# Patient Record
Sex: FEMALE | Race: BLACK OR AFRICAN AMERICAN | Employment: FULL TIME | URBAN - METROPOLITAN AREA
[De-identification: names, ages, dates, MRNs, and addresses within clinical notes are randomized per-mention and may not be internally consistent; named-entity substitution may affect disease eponyms.]

---

## 2023-02-28 ENCOUNTER — HOSPITAL ENCOUNTER (OUTPATIENT)
Facility: HOSPITAL | Age: 58
Discharge: HOME | End: 2023-03-03
Payer: COMMERCIAL

## 2023-02-28 VITALS — HEIGHT: 64 IN | WEIGHT: 157.5 LBS | BODY MASS INDEX: 26.89 KG/M2

## 2023-02-28 LAB
ANION GAP SERPL CALC-SCNC: 2 MMOL/L (ref 3–18)
BUN SERPL-MCNC: 11 MG/DL (ref 7–18)
BUN/CREAT SERPL: 15 (ref 12–20)
CALCIUM SERPL-MCNC: 9.3 MG/DL (ref 8.5–10.1)
CHLORIDE SERPL-SCNC: 109 MMOL/L (ref 100–111)
CO2 SERPL-SCNC: 33 MMOL/L (ref 21–32)
CREAT SERPL-MCNC: 0.75 MG/DL (ref 0.6–1.3)
EKG ATRIAL RATE: 65 BPM
EKG DIAGNOSIS: NORMAL
EKG P AXIS: -16 DEGREES
EKG P-R INTERVAL: 146 MS
EKG Q-T INTERVAL: 380 MS
EKG QRS DURATION: 80 MS
EKG QTC CALCULATION (BAZETT): 395 MS
EKG R AXIS: 69 DEGREES
EKG T AXIS: 66 DEGREES
EKG VENTRICULAR RATE: 65 BPM
ERYTHROCYTE [DISTWIDTH] IN BLOOD BY AUTOMATED COUNT: 15 % (ref 11.6–14.5)
GLUCOSE SERPL-MCNC: 86 MG/DL (ref 74–99)
HCT VFR BLD AUTO: 37.3 % (ref 35–45)
HGB BLD-MCNC: 11.8 G/DL (ref 12–16)
MCH RBC QN AUTO: 28.9 PG (ref 24–34)
MCHC RBC AUTO-ENTMCNC: 31.6 G/DL (ref 31–37)
MCV RBC AUTO: 91.2 FL (ref 78–100)
NRBC # BLD: 0 K/UL (ref 0–0.01)
NRBC BLD-RTO: 0 PER 100 WBC
PLATELET # BLD AUTO: 231 K/UL (ref 135–420)
PMV BLD AUTO: 10.5 FL (ref 9.2–11.8)
POTASSIUM SERPL-SCNC: 4.8 MMOL/L (ref 3.5–5.5)
RBC # BLD AUTO: 4.09 M/UL (ref 4.2–5.3)
SODIUM SERPL-SCNC: 144 MMOL/L (ref 136–145)
WBC # BLD AUTO: 4.8 K/UL (ref 4.6–13.2)

## 2023-02-28 PROCEDURE — 80048 BASIC METABOLIC PNL TOTAL CA: CPT

## 2023-02-28 PROCEDURE — 36415 COLL VENOUS BLD VENIPUNCTURE: CPT

## 2023-02-28 PROCEDURE — 93005 ELECTROCARDIOGRAM TRACING: CPT | Performed by: UROLOGY

## 2023-02-28 PROCEDURE — 85027 COMPLETE CBC AUTOMATED: CPT

## 2023-03-01 NOTE — PERIOP NOTE
Spoke with Melanie Jimenez at [place]'s office. Sena Garduno is working on the clearance. [facility] 190 rec'd call from Sena Garduno. Cardiologist is in the [address] [facility] [address] and his staff working on contacting in between cases to clear.

## 2023-03-01 NOTE — H&P
Urology [address] [place] [address] [address] [address]  Tel: [phone]  Fax: [phone]    Patient : Tere Ramirez   YOB: 1965   Birth Sex: Female      Current Gender: Female      Date:  02/06/2023 2:30 PM    Visit Type:   Office Visit   Assessment/Plan  # Detail Type Description    1. Assessment Microscopic hematuria (R31.29). Patient Plan Patient underwent attempted cystoscopy today she could not tolerated procedure. Will schedule her for cystoscopy possible bladder biopsy under sedation. Risks including pain infection bleeding reviewed she understands will proceed    Plan Orders Cytology Pro XT with reflex to FISH to be performed. Obtained on 02/06/2023. Additional Visit Information      This 59year old female presents for Hematuria. History of Present Illness  1. Hematuria   Pertinent history includes being at least 34years of age but not history of bladder cancer, history of irritative voiding symptoms, prior history of stones, history of renal cancer, family history of bladder cancer, family history of renal cancer, family history of stones, exposure to radiation, self-catheterization, smoking and trauma or urolithiasis. The patient also complains of urinary incontinence. The patient denies chills, dysuria, fever, incomplete emptying, nausea, pressure, secondhand smoke, urethral bleeding, urinary dribbling, urinary frequency and vomiting. Additional information: Year old female patient new to Urology Clinic today with history gross hematuria with wiping after history of stones and is not on any anticoagulation. Did have 3-5 red blood cells on September 8, 2022 on urinalysis. Comments: 02/06/2023   Patient with history of  gross hematuria followed by microscopic hematuria. CT scan obtained no abnormalities were identified. She is here today to undergo cystoscopy.       Past Medical/Surgical History   (Reviewed, updated)  Disease/disorder Onset Date Management Date Comments     finger surgery 06/2022     hemorrhoids           Problem List  Problem List reviewed. Medications (active prior to today)  Medication Instructions Start Date Stop Date Refilled Elsewhere   simvastatin take 1 tablet by oral route  every day in the evening //   Y   omeprazole take 2 capsule by oral route  every day before a meal //   Y   Zyrtec take 1 tablet by oral route  every day //   Y       Medication Reconciliation  Medications reconciled today. Allergies  Ingredient Reaction (Severity) Medication Name Comment   LATEX        Reviewed, no changes. Family History   (Reviewed, updated)      Social History  (Reviewed, updated)  Tobacco use reviewed. Preferred language is [place]. Marital Status/Family/Social Support  Marital status:      Tobacco use status: Current non-smoker. Smoking status: Never smoker. Tobacco Screening  Patient has never used tobacco. Patient has not used tobacco in the last 30 days. Patient has not used smokeless tobacco in the last 30 days. Smoking Status  Type Smoking Status Usage Per Day Years Used Pack Years Total Pack Years    Never smoker         Alcohol  There is no history of alcohol use. Caffeine  The patient uses caffeine: coffee and soda. - 8 oz a day. Review of Systems  System Neg/Pos Details   Constitutional Negative Chills and Fever. ENMT Negative Ear infections and Sore throat. Eyes Negative Blurred vision, Double vision and Eye pain. Respiratory Negative Asthma, Chronic cough, Dyspnea and Wheezing. Cardio Negative Chest pain. GI Negative Constipation, Decreased appetite, Diarrhea, Nausea and Vomiting. Endocrine Negative Cold intolerance, Heat intolerance, Increased thirst and Weight loss. Neuro Negative Headache and Tremors. Psych Negative Anxiety and Depression. Integumentary Negative Itching skin and Rash. MS Negative Back pain and Joint pain. Hema/Lymph Negative Easy bleeding. Vital Signs   Height  Time ft in cm Last Measured Height Position   3:52 PM 5.0 4.00 162.56 11/29/2022 Standing     Weight/BSA/BMI  Time lb oz kg Context BMI kg/m2 BSA m2   3:52 .80  72.030 dressed with shoes 27.26      Measured by  Time Measured by   3:52 PM Sury Lewis Day     Medications (added, continued, or stopped today)  Start Date Medication Directions PRN Status PRN Reason Instruction Stop Date    omeprazole take 2 capsule by oral route  every day before a meal N       simvastatin take 1 tablet by oral route  every day in the evening N       Zyrtec take 1 tablet by oral route  every day N          Active Patient Care Team Members  Name Contact Agency Type Support Role Relationship Active Date Inactive Date Specialty   [facility]   Emergency Contact Other      Leona Cheema   Patient provider PCP          Provider:    Yareli Montgomery MD 02/07/2023 7:22 PM     Document generated by:  Anisha Rodriguez 02/07/2023 07:22 PM      ----------------------------------------------------------------------------------------------------------------------------------------------------------------------      Electronically signed by Yareli Montgomery MD on 02/07/2023 07:52 PM

## 2023-03-02 ENCOUNTER — ANESTHESIA EVENT (OUTPATIENT)
Facility: HOSPITAL | Age: 58
End: 2023-03-02
Payer: COMMERCIAL

## 2023-03-02 ENCOUNTER — ANESTHESIA (OUTPATIENT)
Facility: HOSPITAL | Age: 58
End: 2023-03-02
Payer: COMMERCIAL

## 2023-03-02 ENCOUNTER — HOSPITAL ENCOUNTER (OUTPATIENT)
Facility: HOSPITAL | Age: 58
Setting detail: OUTPATIENT SURGERY
Discharge: HOME | End: 2023-03-02
Attending: UROLOGY | Admitting: UROLOGY
Payer: COMMERCIAL

## 2023-03-02 VITALS
DIASTOLIC BLOOD PRESSURE: 70 MMHG | SYSTOLIC BLOOD PRESSURE: 125 MMHG | HEIGHT: 63 IN | HEART RATE: 69 BPM | RESPIRATION RATE: 16 BRPM | BODY MASS INDEX: 27.89 KG/M2 | TEMPERATURE: 98 F | WEIGHT: 157.4 LBS | OXYGEN SATURATION: 95 %

## 2023-03-02 PROCEDURE — 3700000001 HC ADD 15 MINUTES (ANESTHESIA): Performed by: UROLOGY

## 2023-03-02 PROCEDURE — 3700000000 HC ANESTHESIA ATTENDED CARE: Performed by: UROLOGY

## 2023-03-02 PROCEDURE — 3600000012 HC SURGERY LEVEL 2 ADDTL 15MIN: Performed by: UROLOGY

## 2023-03-02 PROCEDURE — 2709999900 HC NON-CHARGEABLE SUPPLY: Performed by: UROLOGY

## 2023-03-02 PROCEDURE — 6360000002 HC RX W HCPCS: Performed by: NURSE ANESTHETIST, CERTIFIED REGISTERED

## 2023-03-02 PROCEDURE — 3600000002 HC SURGERY LEVEL 2 BASE: Performed by: UROLOGY

## 2023-03-02 PROCEDURE — 7100000011 HC PHASE II RECOVERY - ADDTL 15 MIN: Performed by: UROLOGY

## 2023-03-02 PROCEDURE — 6360000002 HC RX W HCPCS: Performed by: UROLOGY

## 2023-03-02 PROCEDURE — 2500000003 HC RX 250 WO HCPCS: Performed by: NURSE ANESTHETIST, CERTIFIED REGISTERED

## 2023-03-02 PROCEDURE — 2580000003 HC RX 258: Performed by: UROLOGY

## 2023-03-02 PROCEDURE — 7100000010 HC PHASE II RECOVERY - FIRST 15 MIN: Performed by: UROLOGY

## 2023-03-02 RX ORDER — PROPOFOL 10 MG/ML
INJECTION, EMULSION INTRAVENOUS PRN
Status: DISCONTINUED | OUTPATIENT
Start: 2023-03-02 | End: 2023-03-02 | Stop reason: SDUPTHER

## 2023-03-02 RX ORDER — FENTANYL CITRATE 50 UG/ML
INJECTION, SOLUTION INTRAMUSCULAR; INTRAVENOUS PRN
Status: DISCONTINUED | OUTPATIENT
Start: 2023-03-02 | End: 2023-03-02 | Stop reason: SDUPTHER

## 2023-03-02 RX ORDER — MIDAZOLAM HYDROCHLORIDE 1 MG/ML
INJECTION INTRAMUSCULAR; INTRAVENOUS PRN
Status: DISCONTINUED | OUTPATIENT
Start: 2023-03-02 | End: 2023-03-02 | Stop reason: SDUPTHER

## 2023-03-02 RX ORDER — DROPERIDOL 2.5 MG/ML
0.62 INJECTION, SOLUTION INTRAMUSCULAR; INTRAVENOUS
Status: CANCELLED | OUTPATIENT
Start: 2023-03-02 | End: 2023-03-03

## 2023-03-02 RX ORDER — SODIUM CHLORIDE 0.9 % (FLUSH) 0.9 %
5-40 SYRINGE (ML) INJECTION PRN
Status: CANCELLED | OUTPATIENT
Start: 2023-03-02

## 2023-03-02 RX ORDER — OXYCODONE HYDROCHLORIDE 5 MG/1
5 TABLET ORAL
Status: CANCELLED | OUTPATIENT
Start: 2023-03-02 | End: 2023-03-03

## 2023-03-02 RX ORDER — FENTANYL CITRATE 50 UG/ML
25 INJECTION, SOLUTION INTRAMUSCULAR; INTRAVENOUS EVERY 5 MIN PRN
Status: CANCELLED | OUTPATIENT
Start: 2023-03-02

## 2023-03-02 RX ORDER — LIDOCAINE HYDROCHLORIDE 20 MG/ML
INJECTION, SOLUTION EPIDURAL; INFILTRATION; INTRACAUDAL; PERINEURAL PRN
Status: DISCONTINUED | OUTPATIENT
Start: 2023-03-02 | End: 2023-03-02 | Stop reason: SDUPTHER

## 2023-03-02 RX ORDER — SODIUM CHLORIDE, SODIUM LACTATE, POTASSIUM CHLORIDE, CALCIUM CHLORIDE 600; 310; 30; 20 MG/100ML; MG/100ML; MG/100ML; MG/100ML
INJECTION, SOLUTION INTRAVENOUS CONTINUOUS
Status: CANCELLED | OUTPATIENT
Start: 2023-03-02

## 2023-03-02 RX ORDER — LABETALOL HYDROCHLORIDE 5 MG/ML
10 INJECTION, SOLUTION INTRAVENOUS
Status: CANCELLED | OUTPATIENT
Start: 2023-03-02

## 2023-03-02 RX ORDER — ONDANSETRON 2 MG/ML
4 INJECTION INTRAMUSCULAR; INTRAVENOUS
Status: CANCELLED | OUTPATIENT
Start: 2023-03-02 | End: 2023-03-03

## 2023-03-02 RX ORDER — IPRATROPIUM BROMIDE AND ALBUTEROL SULFATE 2.5; .5 MG/3ML; MG/3ML
1 SOLUTION RESPIRATORY (INHALATION)
Status: CANCELLED | OUTPATIENT
Start: 2023-03-02 | End: 2023-03-03

## 2023-03-02 RX ORDER — SODIUM CHLORIDE, SODIUM LACTATE, POTASSIUM CHLORIDE, CALCIUM CHLORIDE 600; 310; 30; 20 MG/100ML; MG/100ML; MG/100ML; MG/100ML
INJECTION, SOLUTION INTRAVENOUS CONTINUOUS
Status: DISCONTINUED | OUTPATIENT
Start: 2023-03-02 | End: 2023-03-02 | Stop reason: HOSPADM

## 2023-03-02 RX ORDER — SODIUM CHLORIDE 9 MG/ML
INJECTION, SOLUTION INTRAVENOUS PRN
Status: CANCELLED | OUTPATIENT
Start: 2023-03-02

## 2023-03-02 RX ORDER — HYDROMORPHONE HYDROCHLORIDE 1 MG/ML
0.25 INJECTION, SOLUTION INTRAMUSCULAR; INTRAVENOUS; SUBCUTANEOUS EVERY 5 MIN PRN
Status: CANCELLED | OUTPATIENT
Start: 2023-03-02

## 2023-03-02 RX ORDER — DIPHENHYDRAMINE HYDROCHLORIDE 50 MG/ML
12.5 INJECTION INTRAMUSCULAR; INTRAVENOUS
Status: CANCELLED | OUTPATIENT
Start: 2023-03-02 | End: 2023-03-03

## 2023-03-02 RX ORDER — SODIUM CHLORIDE 0.9 % (FLUSH) 0.9 %
5-40 SYRINGE (ML) INJECTION EVERY 12 HOURS SCHEDULED
Status: CANCELLED | OUTPATIENT
Start: 2023-03-02

## 2023-03-02 RX ADMIN — PROPOFOL 40 MG: 10 INJECTION, EMULSION INTRAVENOUS at 15:43

## 2023-03-02 RX ADMIN — PROPOFOL 50 MG: 10 INJECTION, EMULSION INTRAVENOUS at 15:40

## 2023-03-02 RX ADMIN — MIDAZOLAM 2 MG: 1 INJECTION INTRAMUSCULAR; INTRAVENOUS at 15:32

## 2023-03-02 RX ADMIN — Medication 2000 MG: at 15:34

## 2023-03-02 RX ADMIN — PROPOFOL 50 MG: 10 INJECTION, EMULSION INTRAVENOUS at 15:39

## 2023-03-02 RX ADMIN — PROPOFOL 30 MG: 10 INJECTION, EMULSION INTRAVENOUS at 15:41

## 2023-03-02 RX ADMIN — LIDOCAINE HYDROCHLORIDE 60 MG: 20 INJECTION, SOLUTION EPIDURAL; INFILTRATION; INTRACAUDAL; PERINEURAL at 15:39

## 2023-03-02 RX ADMIN — SODIUM CHLORIDE, POTASSIUM CHLORIDE, SODIUM LACTATE AND CALCIUM CHLORIDE: 600; 310; 30; 20 INJECTION, SOLUTION INTRAVENOUS at 15:09

## 2023-03-02 RX ADMIN — FENTANYL CITRATE 25 MCG: 50 INJECTION, SOLUTION INTRAMUSCULAR; INTRAVENOUS at 15:32

## 2023-03-02 RX ADMIN — PROPOFOL 30 MG: 10 INJECTION, EMULSION INTRAVENOUS at 15:42

## 2023-03-02 ASSESSMENT — PAIN - FUNCTIONAL ASSESSMENT: PAIN_FUNCTIONAL_ASSESSMENT: NONE - DENIES PAIN

## 2023-03-02 NOTE — INTERVAL H&P NOTE
Update History & Physical    The patient's History and Physical of February 6, 2023 was reviewed with the patient and I examined the patient. There was no change. The surgical site was confirmed by the patient and me. Plan: The risks, benefits, expected outcome, and alternative to the recommended procedure have been discussed with the patient. Patient understands and wants to proceed with the procedure.      Electronically signed by Ophelia Montgomery MD on 3/2/2023 at 3:32 PM

## 2023-03-02 NOTE — PERIOP NOTE
Discharge instructions given to patient and caregiver. Written instructions given to patient to take home. Verbal understanding given by patient and caregiver. ID band removed before leaving.

## 2023-03-02 NOTE — PERIOP NOTE
Reviewed PTA medication list with patient/caregiver and patient/caregiver denies any additional medications. Patient admits to having a responsible adult care for them at home for at least 24 hours after surgery. Patient encouraged to use gown warming system and informed that using said warming gown to regulate body temperature prior to a procedure has been shown to help reduce the risks of blood clots and infection. Patient's pharmacy of choice verified and documented in PTA medication section. Dual skin assessment & fall risk band verification completed with Pawel Burr RN.

## 2023-03-02 NOTE — PERIOP NOTE
TRANSFER - IN REPORT:    Verbal report received from [place], RN on [address]  being received from [facility] [address] for routine post-op      Report consisted of patient's Situation, Background, Assessment and   Recommendations(SBAR). Information from the following report(s) Nurse Handoff Report, Surgery Report, and MAR was reviewed with the receiving nurse. Opportunity for questions and clarification was provided. Assessment completed upon patient's arrival to unit and care assumed.

## 2023-03-02 NOTE — ANESTHESIA POSTPROCEDURE EVALUATION
Post-Anesthesia Evaluation and Assessment    Cardiovascular Function/Vital Signs/Pain Score:  Vitals  BP: 130/67  Temp: 97.6 °F (36.4 °C)  Temp Source: Temporal  Heart Rate: 66  Resp: 16  SpO2: 100 %  Height: 5' 3\" (160 cm)  Weight: 157 lb 6.4 oz (71.4 kg)    Patient is status post Procedure(s):  CYSTOSCOPY, WITH POSSIBLE BLADDER BIOPSY. Nausea/Vomiting: Controlled. Postoperative hydration reviewed and adequate. Pain:  Managed    Mental Status and Level of Consciousness: Baseline and appropriate for discharge. Adequate oxygenation and airway patent. Complications related to anesthesia: None    Post-anesthesia assessment completed. No concerns. Patient has met all discharge requirements.     Signed By: Abi Salas MD    March 2, 2023

## 2023-03-02 NOTE — OP NOTE
Operative Note      Patient: Suraj Thurman  YOB: 1965  MRN: [id number]    Date of Procedure: 3/2/2023    Pre-Op Diagnosis: HEMATURIA    Post-Op Diagnosis: Same       Procedure(s):  CYSTOSCOPY, WITH POSSIBLE BLADDER BIOPSY    Surgeon(s):  Fernando Pearson MD    Assistant:   * No surgical staff found *    Anesthesia: Monitor Anesthesia Care    Estimated Blood Loss (mL): Minimal    Complications: None    Specimens:   * No specimens in log *    Implants:  * No implants in log *      Drains: * No LDAs found *    Findings: normal bladder    Detailed Description of Procedure:   Patient brought the operating placed in supine position. After ministration of IV sedation she was placed into the lithotomy position groin genitalia were prepped and draped in usual sterile fashion I began with a 21 Stateless cystoscope cystourethroscopy was performed using a 30 and 70 degree lens.   No abnormalities were seen throughout the bladder both ureteral orifice ease were normal.  At this point the bladder was emptied cystoscope was removed she was placed back into the supine position she was awoken and transferred to PACU in stable condition        Electronically signed by Yogesh Casas MD on 3/2/2023 at 3:51 PM

## 2023-03-02 NOTE — DISCHARGE INSTRUCTIONS
Kristina Meza. Tea Valente M.D. [facility]  [address], [address] [place], [facility]  Office: [phone]  Fax:    [phone]: Procedure(s):  CYSTOSCOPY, [facility] IMMEDIATELY if any of the following occur: You are unable to urinate. Urgency to urinate is not uncommon. You find yourself urinating small frequent amounts associated with severe lower abdominal discomfort. Bright red blood with clots in the urine. Some reddish urine is not uncommon and should be treated with increasing the amount of fluids you drink. Temperature above 101.5° and / or chills. You are nauseous and / or vomiting and you cannot hold down any fluids. Your pain is not controlled with the pain medication prescribed. Special Considerations:     Do not drive for at least 24 hours after the procedure and until you are no longer taking narcotic pain medication and you are able to move and react without hesitation. MEDICATIONS:  Pain   []  Norco®   []  Percocet®  [] Dilaudid®    []  Tramadol  [] Ketorolac   Antibiotics   []  Cipro   []  Keflex    [] Levaquin   []  Bactrim DS®      [] Cefuroxime   Urination   []  Vesicare®   []  Flomax      Burning   []  Pyridium®   []  UribelTM      Nausea   []  Zofran®   []  Phenergan®      Miscellaneous   []            [] Prescriptions Written on Chart    [x] NO Prescriptions needed       Our office will call you tomorrow to schedule your first follow-up appointment. Please contact [place] [address] [facility] at [phone] or go to the nearest Emergency Department / Urgent Care facility for any other medical questions or concerns.        DISCHARGE SUMMARY from Nurse    PATIENT INSTRUCTIONS:    After general anesthesia or intravenous sedation, for 24 hours or while taking prescription Narcotics:  Limit your activities  Do not drive and operate hazardous machinery  Do not make important personal or business decisions  Do  not drink alcoholic beverages  If you have not urinated within 8 hours after discharge, please contact your surgeon on call. Report the following to your surgeon:  Excessive pain, swelling, redness or odor of or around the surgical area  Temperature over 100.5  Nausea and vomiting lasting longer than 4 hours or if unable to take medications  Any signs of decreased circulation or nerve impairment to extremity: change in color, persistent  numbness, tingling, coldness or increase pain  Any questions    What to do at Home:  Recommended activity: activity as tolerated. If you experience any of the following symptoms as stated above, please follow up with Dr. Jadiel Perea. *  Please give a list of your current medications to your Primary Care Provider. *  Please update this list whenever your medications are discontinued, doses are      changed, or new medications (including over-the-counter products) are added. *  Please carry medication information at all times in case of emergency situations. These are general instructions for a healthy lifestyle:    No smoking/ No tobacco products/ Avoid exposure to second hand smoke  Surgeon General's Warning:  Quitting smoking now greatly reduces serious risk to your health. Obesity, smoking, and sedentary lifestyle greatly increases your risk for illness    A healthy diet, regular physical exercise & weight monitoring are important for maintaining a healthy lifestyle    You may be retaining fluid if you have a history of heart failure or if you experience any of the following symptoms:  Weight gain of 3 pounds or more overnight or 5 pounds in a week, increased swelling in our hands or feet or shortness of breath while lying flat in bed. Please call your doctor as soon as you notice any of these symptoms; do not wait until your next office visit. The discharge information has been reviewed with the patient and caregiver.   The patient and caregiver verbalized understanding. Discharge medications reviewed with the patient and caregiver and appropriate educational materials and side effects teaching were provided. Patient armband removed and shredded.    ___________________________________________________________________________________________________________________________________

## (undated) DEVICE — CYSTO PACK: Brand: CARDINAL HEALTH

## (undated) DEVICE — SOLUTION IRRIG 3000ML H2O STRL BAG

## (undated) DEVICE — SOLUTION IRRIG 1500ML STRL H2O AQUALITE PLAS POUR BTL

## (undated) DEVICE — GLOVE ORANGE PI 7 1/2   MSG9075

## (undated) DEVICE — STRAP,POSITIONING,KNEE/BODY,FOAM,4X60": Brand: MEDLINE

## (undated) DEVICE — 3M™ BAIR PAWS FLEX™ WARMING GOWN, SMALL, 20 PER CASE 81103: Brand: BAIR PAWS™

## (undated) DEVICE — PAD,NON-ADHERENT,3X8,STERILE,LF,1/PK: Brand: MEDLINE

## (undated) DEVICE — GARMENT,MEDLINE,DVT,INT,CALF,MED, GEN2: Brand: MEDLINE

## (undated) DEVICE — ELECTRODE PT RET AD L9FT HI MOIST COND ADH HYDRGEL CORDED

## (undated) DEVICE — CATHETER F BLLN 5CC 22FR 2 W HYDRGEL COAT LESS TRAUM LUB